# Patient Record
Sex: FEMALE | Race: WHITE | NOT HISPANIC OR LATINO
[De-identification: names, ages, dates, MRNs, and addresses within clinical notes are randomized per-mention and may not be internally consistent; named-entity substitution may affect disease eponyms.]

---

## 2018-08-21 PROBLEM — Z00.00 ENCOUNTER FOR PREVENTIVE HEALTH EXAMINATION: Status: ACTIVE | Noted: 2018-08-21

## 2018-08-23 ENCOUNTER — APPOINTMENT (OUTPATIENT)
Dept: ORTHOPEDIC SURGERY | Facility: CLINIC | Age: 28
End: 2018-08-23
Payer: COMMERCIAL

## 2018-08-23 VITALS — WEIGHT: 135 LBS | BODY MASS INDEX: 23.92 KG/M2 | RESPIRATION RATE: 16 BRPM | HEIGHT: 63 IN

## 2018-08-23 DIAGNOSIS — R22.32 LOCALIZED SWELLING, MASS AND LUMP, LEFT UPPER LIMB: ICD-10-CM

## 2018-08-23 DIAGNOSIS — M24.132 OTHER ARTICULAR CARTILAGE DISORDERS, LEFT WRIST: ICD-10-CM

## 2018-08-23 DIAGNOSIS — M24.131 OTHER ARTICULAR CARTILAGE DISORDERS, RIGHT WRIST: ICD-10-CM

## 2018-08-23 DIAGNOSIS — R22.31 LOCALIZED SWELLING, MASS AND LUMP, RIGHT UPPER LIMB: ICD-10-CM

## 2018-08-23 DIAGNOSIS — Z82.62 FAMILY HISTORY OF OSTEOPOROSIS: ICD-10-CM

## 2018-08-23 DIAGNOSIS — Z78.9 OTHER SPECIFIED HEALTH STATUS: ICD-10-CM

## 2018-08-23 DIAGNOSIS — Z80.3 FAMILY HISTORY OF MALIGNANT NEOPLASM OF BREAST: ICD-10-CM

## 2018-08-23 DIAGNOSIS — Z87.39 PERSONAL HISTORY OF OTHER DISEASES OF THE MUSCULOSKELETAL SYSTEM AND CONNECTIVE TISSUE: ICD-10-CM

## 2018-08-23 PROCEDURE — 99203 OFFICE O/P NEW LOW 30 MIN: CPT

## 2018-08-23 PROCEDURE — 73110 X-RAY EXAM OF WRIST: CPT | Mod: 50

## 2018-08-23 PROCEDURE — 76882 US LMTD JT/FCL EVL NVASC XTR: CPT

## 2018-09-10 ENCOUNTER — TRANSCRIPTION ENCOUNTER (OUTPATIENT)
Age: 28
End: 2018-09-10

## 2019-03-12 ENCOUNTER — TRANSCRIPTION ENCOUNTER (OUTPATIENT)
Age: 29
End: 2019-03-12

## 2021-04-16 ENCOUNTER — EMERGENCY (EMERGENCY)
Facility: HOSPITAL | Age: 31
LOS: 1 days | Discharge: HOME | End: 2021-04-16
Admitting: EMERGENCY MEDICINE
Payer: COMMERCIAL

## 2021-04-16 VITALS
DIASTOLIC BLOOD PRESSURE: 75 MMHG | TEMPERATURE: 98 F | OXYGEN SATURATION: 98 % | SYSTOLIC BLOOD PRESSURE: 124 MMHG | HEART RATE: 70 BPM | RESPIRATION RATE: 18 BRPM

## 2021-04-16 DIAGNOSIS — Z20.822 CONTACT WITH AND (SUSPECTED) EXPOSURE TO COVID-19: ICD-10-CM

## 2021-04-16 LAB — SARS-COV-2 RNA SPEC QL NAA+PROBE: SIGNIFICANT CHANGE UP

## 2021-04-16 PROCEDURE — 99282 EMERGENCY DEPT VISIT SF MDM: CPT

## 2021-04-16 PROCEDURE — U0003: CPT

## 2021-04-16 PROCEDURE — 99283 EMERGENCY DEPT VISIT LOW MDM: CPT

## 2021-04-16 PROCEDURE — U0005: CPT

## 2021-04-16 NOTE — ED PROVIDER NOTE - IV ALTEPLASE EXCL REL HIDDEN
Last seen 06/03/2019    Next appointment 10/16/2019    Received prescription renewal request for Amphetamine-dextroamphetamine 20mg Take 1 tablet by mouth 2 times daily  Verified dosage(s) against last provider appt note   Last renewed on 06/03/2019 for 90 day supply with 0 refill(s). PDMP report indicates last refill dispensed on 06/13/2019    Is the patient due for new prescription of this medication(s): Yes  PDMP review:  [x] Criteria MET. Rx pended and sent to provider for approval.  [] Criteria NOT MET. Forwarded to provider for further review and decision on medication(s) requested.      Routing to covering provider for approval show

## 2021-04-16 NOTE — ED PROVIDER NOTE - NSCAREINITIATED _GEN_ER
Assessment  1  Greater trochanteric bursitis of left hip (726 5) (W30 86)    Plan  Greater trochanteric bursitis of left hip    · *1 - [facility]  *  Status: Active  Requested for: 48HJL7526  Care Summary provided  : Yes    Discussion/Summary  Patient discussion: discussed with the patient, 20 minute visit, greater than half of the time was spent on counseling  Explained my current clinical findings and reviewed treatment options with Edgard Thompson today  She does seem to have some gradual improvement of her left lateral hip pain with physical therapy and hence I have renewed her physical therapy prescription in this regard  In the future, if she does have any worsening of her lateral hip pain we will consider doing a trochanteric bursa cortisone injection  In the interim, I have advised her to also continue with home-based iliotibial band stretching and hip range of motion, strengthening exercises  I will see her back in the future on an as-needed basis  Chief Complaint  1  Shoulder Pain  Follow-up left hip pain      History of Present Illness  HPI: Edgard Thompson is here today for a follow-up of her left lateral hip pain  She has a history of left hip trochanteric bursitis for which she underwent a course of physical therapy since her last office visit  She does report some improvement of her left lateral hip pain which is currently mild in intensity and may escalate up to moderate intensity with activity  However, he does not significantly impair her activities of daily living  She denies any associated tingling numbness or weakness of the lower extremities  Denies any significant associated low back pain  No other acute interval development of symptoms in this regard since last office visit  Review of Systems    Constitutional: No fever, no chills, feels well, no tiredness, no recent weight gain or loss  Eyes: No complaints of eyesight problems, no red eyes     ENT: no loss of hearing, no nosebleeds, no sore throat  Cardiovascular: No complaints of chest pain, no palpitations, no leg claudication or lower extremity edema  Respiratory: no compliants of shortness of breath, no wheezing, no cough  Gastrointestinal: no complaints of abdominal pain, no constipation, no nausea or diarrhea, no vomiting, no bloody stools  Genitourinary: no complaints of dysuria, no incontinence  Musculoskeletal: as noted in HPI  Integumentary: no complaints of skin rash or lesion, no itching or dry skin, no skin wounds  Neurological: no complaints of headache, no confusion, no numbness or tingling, no dizziness  Endocrine: No complaints of muscle weakness, no feelings of weakness, no frequent urination, no excessive thirst    Psychiatric: No suicidal thoughts, no anxiety, no feelings of depression  Active Problems  1  Allergic rhinitis (477 9) (J30 9)   2  Cellulitis (682 9) (L03 90)   3  Dermatofibroma (216 9) (D23 9)   4  Diabetes mellitus type 2, controlled (250 00) (E11 9)   5  Eczema (692 9) (L30 9)   6  Encounter for screening colonoscopy (V76 51) (Z12 11)   7  Encounter for screening mammogram for malignant neoplasm of breast (V76 12)   (Z12 31)   8  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)   9  GERD (gastroesophageal reflux disease) (530 81) (K21 9)   10  Glenohumeral arthritis (715 91) (M19 019)   11  Greater trochanteric bursitis of left hip (726 5) (M70 62)   12  Hyperlipidemia (272 4) (E78 5)   13  Ingrown nail (703 0) (L60 0)   14  Labral tear of shoulder (840 8) (S43 439A)   15  Left hip pain (719 45) (M25 552)   16  History of Left knee pain (719 46) (M25 562)   17  Osteoarthritis of left hip (715 95) (M16 12)   18  Personal history of colonic polyps (V12 72) (Z86 010)   19  Plantar warts (078 12) (B07 0)   20  Postprocedural wound infection (998 59) (T81 4XXA)   21  Right shoulder pain (719 41) (M25 511)   22  Rotator cuff injury (959 2) (S46 009A)   23   Rotator cuff syndrome, right (493 02) (M75 101)   24  Subacromial bursitis (726 19) (M75 50)   25  Tinea corporis (110 5) (B35 4)    Past Medical History   · History of gastroenteritis (V12 79) (Z87 19)   · History of Left knee pain (719 46) (M25 562)   · Tinea corporis (110 5) (B35 4)    The active problems and past medical history were reviewed and updated today  Surgical History   · History of Cholecystectomy   · History of Hernia Repair   · History of Ovarian Surgery    The surgical history was reviewed and updated today  Family History  Mother    · Family history of Hypertension (V17 49)  Father    · Family history of Hypertension (V17 49)    The family history was reviewed and updated today  Social History   · Never A Smoker   · Never Drank Alcohol   · Occupation:   · [employer]  The social history was reviewed and updated today  The social history was reviewed and is unchanged  Current Meds   1  Atorvastatin Calcium 20 MG Oral Tablet; take one tablet at bedtime; Therapy: 20FOU0774 to (Evaluate:51Sjx0167)  Requested for: 22RTK8326; Last   Rx:29Jun2017 Ordered   2  MetFORMIN HCl - 1000 MG Oral Tablet; TAKE 1 TABLET TWICE DAILY; Therapy: 70NDE4038 to (Concepción Givens)  Requested for: 28BDO5625; Last   Rx:06Oct2017 Ordered   3  Pantoprazole Sodium 40 MG Oral Tablet Delayed Release; TAKE 1 TABLET AT   BEDTIME; Therapy: 23PZQ1013 to (Evaluate:07Jan2018)  Requested for: 09Oct2017; Last   Rx:09Oct2017 Ordered   4  Sulfamethoxazole-Trimethoprim 800-160 MG Oral Tablet; TAKE 1 TABLET TWICE DAILY   FOR 10 DAYS; Therapy: 36FEL8394 to (Last Ayla Muskrat)  Requested for: 67BXN0632 Ordered   5  Triamcinolone Acetonide 0 5 % External Ointment; APPLY SPARINGLY TO AFFECTED   AREA(S) TWICE DAILY; Therapy: 74DIK7198 to (Last Rx:05Oct2017)  Requested for: 05Oct2017 Ordered    The medication list was reviewed and updated today  Allergies  1   Aspirin TBCR    Vitals   Recorded: 96BDZ1225 03:13PM   Heart Rate 181   Systolic 388 Diastolic 92   Height 5 ft 1 in   Weight 193 lb    BMI Calculated 36 47   BSA Calculated 1 86     Physical Exam    Left Hip: Appearance: Normal  Mild tenderness to palpation over the left trochanteric bursa  ROM: Full except as noted: Some discomfort with flexion, abduction and external rotation  Abduction was 4/5  Special Tests: positive MACARIO test-and-equivocal Jose's test, but-negative impingement test,-negative sacroiliac joint compression test-and-negative Straight Leg Raise  Constitutional - General appearance: Abnormal  obese  Musculoskeletal - Gait and station: Normal    Cardiovascular - Pulses: Normal -Examination of extremities for edema and/or varicosities: Normal    Skin - Skin and subcutaneous tissue: Normal    Neurologic - Cranial nerves: Normal -Sensation: Normal -Lower extremity peripheral neuro exam: Normal    Psychiatric - Orientation to person, place, and time: Normal -Mood and affect: Normal    Eyes   Conjunctiva and lids: Normal     Pupils and irises: Normal        Future Appointments    Date/Time Provider Specialty Site   12/14/2017 01:00 PM SHERRI Culp  [facility]   10/18/2017 01:20 PM SHERRI Carreno  Orthopedic Surgery [address]     Signatures   Electronically signed by :  SHERRI Ott ; Oct 11 2017  3:42PM EST                       (Author) Belle Garcia)

## 2021-04-16 NOTE — ED PROVIDER NOTE - PATIENT PORTAL LINK FT
You can access the FollowMyHealth Patient Portal offered by [facility] by registering at the following website: [email or web address]. By joining [facility]’s FollowMyHealth portal, you will also be able to view your health information using other applications (apps) compatible with our system.